# Patient Record
Sex: FEMALE | Race: WHITE | NOT HISPANIC OR LATINO | URBAN - NONMETROPOLITAN AREA
[De-identification: names, ages, dates, MRNs, and addresses within clinical notes are randomized per-mention and may not be internally consistent; named-entity substitution may affect disease eponyms.]

---

## 2019-10-31 ENCOUNTER — IMPORTED ENCOUNTER (OUTPATIENT)
Dept: URBAN - NONMETROPOLITAN AREA CLINIC 1 | Facility: CLINIC | Age: 74
End: 2019-10-31

## 2019-10-31 PROCEDURE — 92014 COMPRE OPH EXAM EST PT 1/>: CPT

## 2019-10-31 NOTE — PATIENT DISCUSSION
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 STABLEMONITORPVD-Retina flat 360 with no breaks tears or heme.-S&S of RD/RT reviewed with pt.-Stressed that pt should contact our office right away with any changes or increase in symptoms.; 's Notes: ""Alessandra.""  Here with friend Shaye Barrera. "

## 2020-11-02 ENCOUNTER — IMPORTED ENCOUNTER (OUTPATIENT)
Dept: URBAN - NONMETROPOLITAN AREA CLINIC 1 | Facility: CLINIC | Age: 75
End: 2020-11-02

## 2020-11-02 PROBLEM — H43.813: Noted: 2020-11-02

## 2020-11-02 PROCEDURE — 92014 COMPRE OPH EXAM EST PT 1/>: CPT

## 2020-11-02 NOTE — PATIENT DISCUSSION
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 STABLEMONITORPVD OU STABLE OU-Retina flat 360 with no breaks tears or heme.-S&S of RD/RT reviewed with pt.-Stressed that pt should contact our office right away with any changes or increase in symptoms.; Dr's Notes: ""Alessandra.""  Here with friend Richar Dee. "

## 2021-11-08 ENCOUNTER — IMPORTED ENCOUNTER (OUTPATIENT)
Dept: URBAN - NONMETROPOLITAN AREA CLINIC 1 | Facility: CLINIC | Age: 76
End: 2021-11-08

## 2021-11-08 PROCEDURE — 92014 COMPRE OPH EXAM EST PT 1/>: CPT

## 2021-11-08 NOTE — PATIENT DISCUSSION
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 "

## 2022-04-09 ASSESSMENT — TONOMETRY
OS_IOP_MMHG: 16
OD_IOP_MMHG: 16
OD_IOP_MMHG: 1615
OD_IOP_MMHG: 16
OS_IOP_MMHG: 16

## 2022-04-09 ASSESSMENT — VISUAL ACUITY
OD_CC: J1
OS_CC: 20/25+
OD_CC: 20/30
OD_CC: 20/25-2
OD_CC: J1
OS_CC: 20/25-
OS_CC: 20/25
OD_CC: 20/30
OS_CC: J1
OS_CC: J1
OU_CC: 20/20-

## 2022-11-09 ENCOUNTER — COMPREHENSIVE EXAM (OUTPATIENT)
Dept: RURAL CLINIC 1 | Facility: CLINIC | Age: 77
End: 2022-11-09

## 2022-11-09 DIAGNOSIS — H43.813: ICD-10-CM

## 2022-11-09 DIAGNOSIS — Z96.1: ICD-10-CM

## 2022-11-09 DIAGNOSIS — H26.491: ICD-10-CM

## 2022-11-09 PROCEDURE — 92014 COMPRE OPH EXAM EST PT 1/>: CPT

## 2022-11-09 ASSESSMENT — VISUAL ACUITY
OS_SC: 20/25-1
OU_SC: 20/20-2

## 2022-11-09 ASSESSMENT — TONOMETRY
OS_IOP_MMHG: 16
OD_IOP_MMHG: 16

## 2022-12-19 ENCOUNTER — CONSULTATION/EVALUATION (OUTPATIENT)
Dept: RURAL CLINIC 1 | Facility: CLINIC | Age: 77
End: 2022-12-19

## 2022-12-19 PROCEDURE — 66821 AFTER CATARACT LASER SURGERY: CPT

## 2022-12-19 PROCEDURE — 92014 COMPRE OPH EXAM EST PT 1/>: CPT

## 2022-12-19 ASSESSMENT — VISUAL ACUITY
OU_SC: 20/25-3
OS_SC: 20/20-2
OU_SC: 20/40
OD_SC: 20/40
OD_SC: 20/25-3
OS_SC: 20/40

## 2022-12-19 ASSESSMENT — TONOMETRY
OS_IOP_MMHG: 16
OD_IOP_MMHG: 16

## 2022-12-19 NOTE — PROCEDURE NOTE: CLINICAL
PROCEDURE NOTE: YAG Capsulotomy #95 OD. Diagnosis: Other Secondary Cataract. Anesthesia: Topical. The purpose and nature of the procedure, possible alternative methods of treatment, the risks involved and the possibility of complications were discussed with patient. The Patient wishes to proceed and the consent was signed. 1 gtt Prolensa applied. The laser was then performed under topical anesthesia with no complications. Post op instructions were given to patient as well as a follow-up appointment. Patient was advised to call our office if any questions or concerns. Soni Bateman

## 2022-12-19 NOTE — PATIENT DISCUSSION
(Surgical)  Visually Significant secondary to glare; schedule YAG Cap. Pros, cons, risks and benefits discussed with patient. Patient wishes to proceed OD. Epidermal Sutures: 6-0 Nylon

## 2023-01-05 ENCOUNTER — POST-OP (OUTPATIENT)
Dept: RURAL CLINIC 1 | Facility: CLINIC | Age: 78
End: 2023-01-05

## 2023-01-05 DIAGNOSIS — Z96.1: ICD-10-CM

## 2023-01-05 PROCEDURE — 99024 POSTOP FOLLOW-UP VISIT: CPT

## 2023-01-05 ASSESSMENT — VISUAL ACUITY
OD_PH: 20/20
OD_SC: 20/25
OS_SC: 20/20

## 2023-01-05 ASSESSMENT — TONOMETRY
OS_IOP_MMHG: 16
OD_IOP_MMHG: 16